# Patient Record
Sex: FEMALE | Race: WHITE | Employment: FULL TIME | URBAN - NONMETROPOLITAN AREA
[De-identification: names, ages, dates, MRNs, and addresses within clinical notes are randomized per-mention and may not be internally consistent; named-entity substitution may affect disease eponyms.]

---

## 2022-06-07 ENCOUNTER — OFFICE VISIT (OUTPATIENT)
Dept: OBGYN CLINIC | Age: 22
End: 2022-06-07
Payer: COMMERCIAL

## 2022-06-07 ENCOUNTER — HOSPITAL ENCOUNTER (OUTPATIENT)
Age: 22
Setting detail: SPECIMEN
Discharge: HOME | End: 2022-06-07

## 2022-06-07 VITALS
WEIGHT: 134.6 LBS | BODY MASS INDEX: 21.63 KG/M2 | HEIGHT: 66 IN | SYSTOLIC BLOOD PRESSURE: 108 MMHG | DIASTOLIC BLOOD PRESSURE: 80 MMHG

## 2022-06-07 DIAGNOSIS — Z01.419 VISIT FOR GYNECOLOGIC EXAMINATION: Primary | ICD-10-CM

## 2022-06-07 PROCEDURE — 99385 PREV VISIT NEW AGE 18-39: CPT

## 2022-06-07 RX ORDER — DEXTROAMPHETAMINE SACCHARATE, AMPHETAMINE ASPARTATE, DEXTROAMPHETAMINE SULFATE AND AMPHETAMINE SULFATE 7.5; 7.5; 7.5; 7.5 MG/1; MG/1; MG/1; MG/1
TABLET ORAL
COMMUNITY
Start: 2022-05-03

## 2022-06-07 RX ORDER — DEXTROAMPHETAMINE SACCHARATE, AMPHETAMINE ASPARTATE MONOHYDRATE, DEXTROAMPHETAMINE SULFATE AND AMPHETAMINE SULFATE 7.5; 7.5; 7.5; 7.5 MG/1; MG/1; MG/1; MG/1
CAPSULE, EXTENDED RELEASE ORAL
COMMUNITY
Start: 2022-05-03

## 2022-06-07 ASSESSMENT — ENCOUNTER SYMPTOMS
SHORTNESS OF BREATH: 0
ABDOMINAL PAIN: 0
CONSTIPATION: 0
DIARRHEA: 0

## 2022-06-07 NOTE — PROGRESS NOTES
YEARLY PHYSICAL    Date of service: 2022    Dexter Wills  Is a 24 y.o.  single female    PT's PCP is: Josie Valverde MD     : 2000                                         Chaperone for Intimate Exam   Chaperone was offered as part of the rooming process. Patient declined and agrees to continue with exam without a chaperone. Subjective:       Patient's last menstrual period was 2022 (approximate). Are your menses regular: yes    OB History    Para Term  AB Living   0 0 0 0 0 0   SAB IAB Ectopic Molar Multiple Live Births   0 0 0 0 0 0        Social History     Tobacco Use   Smoking Status Never Smoker   Smokeless Tobacco Never Used        Social History     Substance and Sexual Activity   Alcohol Use Yes       Family History   Problem Relation Age of Onset    Diabetes Maternal Grandmother     Hypertension Maternal Grandmother     Ovarian Cancer Maternal Great Grandmother        Any family history of breast or ovarian cancer: Yes    Any family history of blood clots: No      Allergies: Patient has no known allergies. Current Outpatient Medications:     amphetamine-dextroamphetamine (ADDERALL) 30 MG tablet, TAKE 1 TABLET ORALLY ONCE DAILY IN PM 30 DAYS, Disp: , Rfl:     amphetamine-dextroamphetamine (ADDERALL XR) 30 MG extended release capsule, TAKE 1 CAPSULE BY MOUTH EVERY DAY IN THE MORNING - DNF: 2022, Disp: , Rfl:     Social History     Substance and Sexual Activity   Sexual Activity Yes    Partners: Male       Any bleeding or pain with intercourse: Yes    Last Yearly:  never    Last pap: never    Last HPV: never    Last Mammogram: never    Last Dexascan never    Last colorectal screen- type:never    Do you do self breast exams: No    Past Medical History:   Diagnosis Date    Depression        History reviewed. No pertinent surgical history.     Family History   Problem Relation Age of Onset    Diabetes Maternal Grandmother     Hypertension Maternal Grandmother     Ovarian Cancer Maternal Great Grandmother        Chief Complaint   Patient presents with    New Patient     Here as new patient.  Annual Exam     Here for annual exam. Currently has Paragard IUD and would like that checked. It was inserted Oct 2019. First annual and pap smear.  Dyspareunia     Has discomfort and burning sensation following intercourse. Had recent STD testing along with UA and swabbed for infection at [facility] this past Saturday with all neg results. States the pain started approx 5 days ago. PE:  Vital Signs  Blood pressure 108/80, height 5' 6\" (1.676 m), weight 134 lb 9.6 oz (61.1 kg), last menstrual period 05/24/2022. Estimated body mass index is 21.73 kg/m² as calculated from the following:    Height as of this encounter: 5' 6\" (1.676 m). Weight as of this encounter: 134 lb 9.6 oz (61.1 kg). Labs:    No results found for this visit on 06/07/22. No data recorded    NURSE: SAULO carrera RN    HPI: Patient presents to establish care for annual visit. Denies breast concerns. Encouraged SBE. Denies bowel concerns. Does state that she has abdominal pain/dysuria after intercourse that will then resolve. She was tested at urgent care for STDs, UTIs, vaginal infections - all negative. Denies vaginal symptoms at this time. Has had new partner but timing does not correlate with the start of these symptoms. She states that she used to take a medication to help with overactive bladder. Recommended that she try Azo for symptom management with dysuria. If UTIs develop, discussed macrodantin for prevention. Denies vaginal discharge or other concerns at this time. Does have IUD in place since 2019 that controls cycles well for her. Review of Systems   Constitutional: Negative for chills, fatigue and fever. Respiratory: Negative for shortness of breath.     Cardiovascular: Negative for chest pain. Gastrointestinal: Negative for abdominal pain, constipation and diarrhea. Genitourinary: Positive for dyspareunia (pain after intercourse) and dysuria (after intercourse). Negative for frequency, menstrual problem, pelvic pain, urgency, vaginal bleeding and vaginal discharge. Neurological: Negative for dizziness, light-headedness and headaches. Physical Exam  Constitutional:       Appearance: Normal appearance. Genitourinary:      Vulva normal.      Right Labia: No rash, tenderness or lesions. Left Labia: No tenderness, lesions or rash. No vaginal discharge, tenderness or bleeding. Right Adnexa: not tender and not full. Left Adnexa: not tender and not full. No cervical motion tenderness or discharge. IUD strings visualized. Uterus is not enlarged or tender. Pelvic exam was performed with patient in the lithotomy position. Breasts: Breasts are symmetrical.      Right: No inverted nipple, nipple discharge, skin change or tenderness. Left: No inverted nipple, nipple discharge, skin change or tenderness. HENT:      Head: Normocephalic and atraumatic. Mouth/Throat:      Mouth: Mucous membranes are moist.   Eyes:      Extraocular Movements: Extraocular movements intact. Cardiovascular:      Rate and Rhythm: Normal rate. Pulmonary:      Effort: Pulmonary effort is normal.   Abdominal:      General: Abdomen is flat. There is no distension. Palpations: Abdomen is soft. Tenderness: There is no abdominal tenderness. Musculoskeletal:         General: Normal range of motion. Cervical back: Normal range of motion. Neurological:      General: No focal deficit present. Mental Status: She is alert and oriented to person, place, and time. Skin:     General: Skin is warm and dry. Psychiatric:         Mood and Affect: Mood normal.         Behavior: Behavior normal.         Thought Content:  Thought content normal. Judgment: Judgment normal.   Chaperone present: chaperone declined. Assessment and Plan          Diagnosis Orders   1. Visit for gynecologic examination  PAP SMEAR       Repeat Annual every 1 year  Cervical Cytology Evaluation begins at 24years old. If Negative Cytology, Follow-up screening per current guidelines. Mammograms every 1year. If 36 yo and last mammogram was negative. Routine healthmaintenance per patients PCP. I am having Darnell Olvera maintain her amphetamine-dextroamphetamine and amphetamine-dextroamphetamine. Return in about 1 year (around 6/7/2023) for annual.    She was also counseled on her preventative health maintenance recommendations and follow-up. There are no Patient Instructions on file for this visit.     Rupali Watkins PA-C,6/7/2022 11:11 AM

## 2022-06-13 LAB — CYTOLOGY REPORT: NORMAL
